# Patient Record
(demographics unavailable — no encounter records)

---

## 2025-03-26 NOTE — HISTORY OF PRESENT ILLNESS
[FreeTextEntry1] : 73 year old gentleman presents to the office for a hospital follow-up visit. Patient was seen by the urology team in  hospital this past week s/p inguinal hernia repair. Patient went into urinary retention. Patient was discharged home on 5 days of tamsulosin. Denies irritative and obstructive urinary symptoms. Denies fevers, chills, n/v/d, SOB, and chest pain.  PVR - 81 ml.  3/29/23  The patient is here today for f/u of BPH/LUTS. He continues with tamsulosin, happy with voiding pattern. Denies interval change in med/surg history since last office visit.  PVR 54 ml.  3/27/24  The patient is here today for f/u of BPH/LUTS. He continues with tamsulosin, happy with voiding pattern. Denies interval change in med/surg history since last office visit.  Now c/o erectile dysfunction, difficulty achieving and maintaining erection.  BLANK - anodular, 45 gms  Plan: PSA Continue tamsulosin Reviewed options for ED. Trial of se Vidhya reviewed.  3/26/25  75-year-old male with BPH/LUTS. He is on Tamsulosin 0.4mg daily. Stream is strong and he feels like he empties his bladder well. He is bothered by nocturia. Urinates 4-5 times per night. He stops fluids 3 hours before bedtime. Drinks 1 diet coke per day. Occasionally has spicy foods and tomatoes. Drinks 24 ounces of water per day.   He has a history of ED. Tried Viagra and Cialis; they no longer work.   Since his last visit he has had dosage adjustments in his medications due to hypotension. Losartan and amlodipine were discontinued. Sinemet increased to 4 times daily.   PSA  3/27/2024-0.91 3/29/2023-1.05  Plan:  -Behavior Modification-Avoid bladder irritants such as caffeine, carbonated beverages, citrus, spicy foods, tomatoes, alcohol. Drink to thirst. Stop fluids 3 hours before bedtime. Literature provided on diet modification and controlling bladder urges. -Gemtesa trial x 4 weeks. He will call office to provide update on symptoms.  -PSA today. Will f/u with result.  -Referred to Dr. Mcgarry for management of ED -F/u in 6 months   I, Dr. Og Aquino, personally performed the evaluation and management (E/M) services for this established patient who presents today with (a) new problem(s)/exacerbation of (an) existing condition(s).  That E/M includes conducting the clinically appropriate interval history &/or exam, assessing all new/exacerbated conditions, and establishing a new plan of care.  Today, my CELIA Melany, was here to observe &/or participate in the visit & follow plan of care established by me.

## 2025-05-20 NOTE — ASSESSMENT
[FreeTextEntry1] : ED BPH/OAB - follows with [facility]   discussed PDUS w/ [facility] - pt not interested  continue tamsulosin 0.4 mg daily UA/UCx PVR to r/o retention - 113 cc  may schedule pdus by phone otherwise f/u with Dr Aquino

## 2025-05-20 NOTE — PHYSICAL EXAM
[General Appearance - Well Developed] : well developed [General Appearance - Well Nourished] : well nourished [] : no respiratory distress [Urethral Meatus] : meatus normal [de-identified] : 10 cc testicles b/l, palpable vas, no varicoceles appreciated

## 2025-05-20 NOTE — HISTORY OF PRESENT ILLNESS
[FreeTextEntry1] : 75M with BPH/LUTS, presents as internal referral for ED  PMH: BPH, CKD, depression, HLD, HTN, parkinsonism, RBBB, REM-behavior disorder  Referred by: Patalisonandrea  Presents with wife   BPH/OAB: tamsulosin 0.4 mg started for retention after hernia repair 2022 gemtesa started 3/2025, but no longer on it.  Had 1 time episode went into urinary retention s/p right inguinal hernia repair  Clarified that he is not on finasteride.  Nocturia 4-5x and daytime urinary frequency.  Feels that he empties.  Urinary stream is good.   ED: failing PDE5is (both Cialis and Viagra at max doses)  Had penile doppler done with outside urologist which revealed "poor blood flow"  Not interested in ICI  Able to ejaculate with significant stimulation in flaccid state   PMH: PMH: BPH, CKD, depression, HLD, HTN, parkinsonism, RBBB PSH: left femur neck fracture with surgery 5/5/2022, right hernia repair surgery 9/2022  Meds: baby aspirin, atorvastatin, buproprion, carbidoba-levodopa, flomax 0.4 mg, melatonin, clonazepam  All: Penicillin FH: SH:   PSA 3/26/2025 - 0.88  3/27/2024-0.91 3/29/2023-1.05

## 2025-05-20 NOTE — HISTORY OF PRESENT ILLNESS
[FreeTextEntry1] : 75M with BPH/LUTS, presents as internal referral for ED  PMH: BPH, CKD, depression, HLD, HTN, parkinsonism, RBBB, REM-behavior disorder  Referred by: Ptaalisonandrea  Presents with wife   BPH/OAB: tamsulosin 0.4 mg started for retention after hernia repair 2022 gemtesa started 3/2025, but no longer on it.  Had 1 time episode went into urinary retention s/p right inguinal hernia repair  Clarified that he is not on finasteride.  Nocturia 4-5x and daytime urinary frequency.  Feels that he empties.  Urinary stream is good.   ED: failing PDE5is (both Cialis and Viagra at max doses)  Had penile doppler done with outside urologist which revealed "poor blood flow"  Not interested in ICI  Able to ejaculate with significant stimulation in flaccid state   PMH: PMH: BPH, CKD, depression, HLD, HTN, parkinsonism, RBBB PSH: left femur neck fracture with surgery 5/5/2022, right hernia repair surgery 9/2022  Meds: baby aspirin, atorvastatin, buproprion, carbidoba-levodopa, flomax 0.4 mg, melatonin, clonazepam  All: Penicillin FH: SH:   PSA 3/26/2025 - 0.88  3/27/2024-0.91 3/29/2023-1.05

## 2025-05-20 NOTE — PHYSICAL EXAM
[General Appearance - Well Developed] : well developed [General Appearance - Well Nourished] : well nourished [] : no respiratory distress [Urethral Meatus] : meatus normal [de-identified] : 10 cc testicles b/l, palpable vas, no varicoceles appreciated